# Patient Record
Sex: FEMALE | Race: WHITE | NOT HISPANIC OR LATINO | URBAN - METROPOLITAN AREA
[De-identification: names, ages, dates, MRNs, and addresses within clinical notes are randomized per-mention and may not be internally consistent; named-entity substitution may affect disease eponyms.]

---

## 2022-02-24 ENCOUNTER — NEW PATIENT (OUTPATIENT)
Dept: URBAN - METROPOLITAN AREA CLINIC 79 | Facility: CLINIC | Age: 57
End: 2022-02-24

## 2022-02-24 DIAGNOSIS — H25.13: ICD-10-CM

## 2022-02-24 DIAGNOSIS — H52.03: ICD-10-CM

## 2022-02-24 PROCEDURE — 99204 OFFICE O/P NEW MOD 45 MIN: CPT

## 2022-02-24 PROCEDURE — 92015 DETERMINE REFRACTIVE STATE: CPT

## 2022-02-24 ASSESSMENT — VISUAL ACUITY
OS_CC: 20/20-1
OS_SC: 20/100
OD_SC: 20/100
OD_CC: 20/25-2
OD_CC: 20/30-1
OS_CC: 20/30-1

## 2022-02-24 ASSESSMENT — TONOMETRY
OD_IOP_MMHG: 11
OS_IOP_MMHG: 11

## 2024-01-30 ENCOUNTER — OFFICE VISIT (OUTPATIENT)
Dept: OBGYN CLINIC | Facility: CLINIC | Age: 59
End: 2024-01-30
Payer: COMMERCIAL

## 2024-01-30 VITALS
HEIGHT: 66 IN | DIASTOLIC BLOOD PRESSURE: 66 MMHG | BODY MASS INDEX: 28.93 KG/M2 | SYSTOLIC BLOOD PRESSURE: 110 MMHG | WEIGHT: 180 LBS

## 2024-01-30 DIAGNOSIS — Z12.31 ENCOUNTER FOR SCREENING MAMMOGRAM FOR BREAST CANCER: ICD-10-CM

## 2024-01-30 DIAGNOSIS — Z01.419 ROUTINE GYNECOLOGICAL EXAMINATION: Primary | ICD-10-CM

## 2024-01-30 DIAGNOSIS — Z12.4 SCREENING FOR MALIGNANT NEOPLASM OF THE CERVIX: ICD-10-CM

## 2024-01-30 PROCEDURE — S0610 ANNUAL GYNECOLOGICAL EXAMINA: HCPCS | Performed by: OBSTETRICS & GYNECOLOGY

## 2024-01-30 RX ORDER — TEMAZEPAM 30 MG/1
30 CAPSULE ORAL
COMMUNITY

## 2024-01-30 RX ORDER — ATORVASTATIN CALCIUM 40 MG/1
40 TABLET, FILM COATED ORAL DAILY
COMMUNITY

## 2024-01-30 RX ORDER — CEFUROXIME AXETIL 250 MG/1
TABLET ORAL
COMMUNITY
Start: 2024-01-03

## 2024-01-30 RX ORDER — LAMOTRIGINE 100 MG/1
500 TABLET ORAL DAILY
COMMUNITY

## 2024-01-30 RX ORDER — OXYBUTYNIN CHLORIDE 10 MG/1
10 TABLET, EXTENDED RELEASE ORAL DAILY
COMMUNITY

## 2024-01-30 RX ORDER — ATOGEPANT 60 MG/1
1 TABLET ORAL DAILY
COMMUNITY

## 2024-01-30 RX ORDER — EPINEPHRINE 0.3 MG/.3ML
INJECTION SUBCUTANEOUS
COMMUNITY
Start: 2023-08-24

## 2024-01-30 NOTE — PROGRESS NOTES
[facility] - [place] [id number] [place] [address]    ASSESSMENT/PLAN: Megan Anderson is a 58 y.o.  who presents for annual gynecologic exam.    Encounter for routine gynecologic examination  - Routine well woman exam completed today.  - Cervical Cancer Screening: Current ASCCP Guidelines reviewed. Last Pap: Not on file . Next Pap Due: today  - HPV Vaccination status: Not immunized  - Contraceptive counseling discussed.  Current contraception: none  - Breast Cancer Screening: Last Mammogram Not on file, ordered. Scheduled for next week  - Colorectal cancer screening was not ordered.  - The following were reviewed in today's visit: breast self exam and mammography screening ordered    Additional problems addressed during this visit:  1. Routine gynecological examination    2. Encounter for screening mammogram for breast cancer  -     Mammo screening bilateral w 3d & cad; Future    3. Screening for malignant neoplasm of the cervix  -     IGP, Aptima HPV, Rfx 16/18,45        CC:  Annual Gynecologic Examination    HPI: Megan Anderson is a 58 y.o.  who presents for annual gynecologic examination.  HPI    The following portions of the patient's history were reviewed and updated as appropriate: She  has a past medical history of Bipolar 1 disorder (HCC), Depression, and Migraine.  She  has a past surgical history that includes Hysteroscopy w/ endometrial ablation and Dilation and curettage of uterus.  Her family history includes Hyperlipidemia in her father; Lung cancer in her father.  She  reports that she has never smoked. She has never used smokeless tobacco. She reports that she does not currently use alcohol. She reports that she does not use drugs.  Current Outpatient Medications   Medication Sig Dispense Refill    atorvastatin (LIPITOR) 40 mg tablet Take 40 mg by mouth daily      EPINEPHrine (EPIPEN) 0.3 mg/0.3 mL SOAJ INJECT INTRAMUSCULARLY ONCE AS NEEDED FOR ANAPHYLAXIS       "lamoTRIgine (LaMICtal) 100 mg tablet Take 500 mg by mouth daily      oxybutynin (DITROPAN-XL) 10 MG 24 hr tablet Take 10 mg by mouth daily      Qulipta 60 MG TABS Take 1 tablet by mouth daily      SUMAtriptan Succinate 6 MG/0.5ML SOAJ INJECT 1 AT ONSET OF MIGRAINE, MAY REPEAT ONCE IN 1 HOUR; MAX 2 IN 24 HOURS      temazepam (RESTORIL) 30 mg capsule Take 30 mg by mouth daily at bedtime       No current facility-administered medications for this visit.     She is allergic to codeine and statins..    Review of Systems      Objective:  /66 (BP Location: Right arm, Patient Position: Sitting, Cuff Size: Standard)   Ht 5' 6\" (1.676 m)   Wt 81.6 kg (180 lb)   Breastfeeding No   BMI 29.05 kg/m²    Physical Exam      PE:  General Appearance: alert and oriented, in no acute distress.   HEENT: PERRL, thyroid without masses or tenderness  Breast: No masses, tenderness, skin changes, nipple D/C or axillary or supraclavicular adenopathy  Abdomen: Soft, non-tender, non-distended, no masses, no rebound or guarding.  Pelvic:       External genitalia: Normal appearance, no abnormal pigmentation, no lesions or masses. Normal [place]th[place]'s and [place]'s.      Urinary system: Urethral meatus normal, bladder non-tender.      Vaginal: normal mucosa without prolapse or lesions. Normal-appearing physiologic discharge      Cervix: Normal-appearing, well-epithelialized, no gross lesions or masses No cervical motion tenderness.      Adnexa: No adnexal masses or tenderness noted.      Uterus: Normal-sized, regular contour, midline, mobile, no uterine tenderness.  Extremities: Normal range of motion.   Skin: normal, no rash or abnormalities  Neurologic: alert, oriented x3  Psychiatric: Appropriate affect, mood stable, cooperative with exam.  "

## 2024-02-02 LAB
CYTOLOGIST CVX/VAG CYTO: NORMAL
DX ICD CODE: NORMAL
HPV GENOTYPE REFLEX: NORMAL
HPV I/H RISK 4 DNA CVX QL PROBE+SIG AMP: NEGATIVE
OTHER STN SPEC: NORMAL
PATH REPORT.FINAL DX SPEC: NORMAL
SL AMB NOTE:: NORMAL
SL AMB SPECIMEN ADEQUACY: NORMAL
SL AMB TEST METHODOLOGY: NORMAL

## 2024-02-12 ENCOUNTER — HOSPITAL ENCOUNTER (OUTPATIENT)
Dept: HOSPITAL 99 - HWWDC | Age: 59
End: 2024-02-12
Payer: COMMERCIAL

## 2024-02-12 DIAGNOSIS — Z12.31: Primary | ICD-10-CM

## 2024-02-15 ENCOUNTER — ESTABLISHED COMPREHENSIVE EXAM (OUTPATIENT)
Dept: URBAN - METROPOLITAN AREA CLINIC 79 | Facility: CLINIC | Age: 59
End: 2024-02-15

## 2024-02-15 DIAGNOSIS — H52.03: ICD-10-CM

## 2024-02-15 DIAGNOSIS — H25.13: ICD-10-CM

## 2024-02-15 PROCEDURE — 92015 DETERMINE REFRACTIVE STATE: CPT

## 2024-02-15 PROCEDURE — 92014 COMPRE OPH EXAM EST PT 1/>: CPT

## 2024-02-15 ASSESSMENT — VISUAL ACUITY
OD_CC: 20/20
OS_CC: J2
OD_CC: J1
OS_CC: 20/25

## 2024-02-15 ASSESSMENT — TONOMETRY
OS_IOP_MMHG: 12
OD_IOP_MMHG: 12

## 2024-09-17 ENCOUNTER — HOSPITAL ENCOUNTER (EMERGENCY)
Dept: HOSPITAL 99 - EMR | Age: 59
Discharge: HOME | End: 2024-09-17
Payer: COMMERCIAL

## 2024-09-17 VITALS — RESPIRATION RATE: 14 BRPM | DIASTOLIC BLOOD PRESSURE: 64 MMHG | SYSTOLIC BLOOD PRESSURE: 111 MMHG

## 2024-09-17 VITALS — RESPIRATION RATE: 14 BRPM

## 2024-09-17 VITALS — RESPIRATION RATE: 13 BRPM

## 2024-09-17 VITALS — SYSTOLIC BLOOD PRESSURE: 150 MMHG | DIASTOLIC BLOOD PRESSURE: 86 MMHG | RESPIRATION RATE: 17 BRPM

## 2024-09-17 VITALS — RESPIRATION RATE: 18 BRPM

## 2024-09-17 VITALS — RESPIRATION RATE: 20 BRPM

## 2024-09-17 VITALS — DIASTOLIC BLOOD PRESSURE: 61 MMHG | SYSTOLIC BLOOD PRESSURE: 110 MMHG | RESPIRATION RATE: 14 BRPM

## 2024-09-17 VITALS — BODY MASS INDEX: 26.7 KG/M2

## 2024-09-17 VITALS — RESPIRATION RATE: 17 BRPM

## 2024-09-17 VITALS — RESPIRATION RATE: 14 BRPM | SYSTOLIC BLOOD PRESSURE: 106 MMHG | DIASTOLIC BLOOD PRESSURE: 61 MMHG

## 2024-09-17 VITALS — RESPIRATION RATE: 12 BRPM

## 2024-09-17 VITALS — RESPIRATION RATE: 26 BRPM

## 2024-09-17 VITALS — RESPIRATION RATE: 19 BRPM

## 2024-09-17 VITALS — RESPIRATION RATE: 21 BRPM

## 2024-09-17 DIAGNOSIS — L29.9: ICD-10-CM

## 2024-09-17 DIAGNOSIS — Z88.5: ICD-10-CM

## 2024-09-17 DIAGNOSIS — R06.02: ICD-10-CM

## 2024-09-17 DIAGNOSIS — T78.2XXA: ICD-10-CM

## 2024-09-17 DIAGNOSIS — Z91.030: ICD-10-CM

## 2024-09-17 DIAGNOSIS — R07.89: ICD-10-CM

## 2024-09-17 DIAGNOSIS — R09.89: ICD-10-CM

## 2024-09-17 DIAGNOSIS — F32.A: ICD-10-CM

## 2024-09-17 DIAGNOSIS — R20.2: ICD-10-CM

## 2024-09-17 DIAGNOSIS — T63.441A: Primary | ICD-10-CM

## 2024-09-17 DIAGNOSIS — F41.9: ICD-10-CM

## 2024-09-17 DIAGNOSIS — F41.0: ICD-10-CM

## 2024-09-17 DIAGNOSIS — I45.10: ICD-10-CM

## 2024-09-17 DIAGNOSIS — R51.9: ICD-10-CM

## 2024-09-17 PROCEDURE — 94640 AIRWAY INHALATION TREATMENT: CPT

## 2024-09-17 PROCEDURE — 96360 HYDRATION IV INFUSION INIT: CPT

## 2024-09-17 PROCEDURE — 96372 THER/PROPH/DIAG INJ SC/IM: CPT

## 2024-09-17 PROCEDURE — 99291 CRITICAL CARE FIRST HOUR: CPT

## 2024-09-17 RX ADMIN — ACETAMINOPHEN 650 MG: 325 TABLET ORAL at 17:32

## 2024-09-17 RX ADMIN — SODIUM CHLORIDE 1000: 900 INJECTION, SOLUTION INTRAVENOUS at 18:05

## 2024-09-17 RX ADMIN — IPRATROPIUM BROMIDE AND ALBUTEROL SULFATE 3 ML: 2.5; .5 SOLUTION RESPIRATORY (INHALATION) at 18:03

## 2024-09-17 RX ADMIN — EPINEPHRINE 0.3 MG: 1 INJECTION, SOLUTION, CONCENTRATE INTRAVENOUS at 18:05

## 2025-03-05 ENCOUNTER — ANNUAL EXAM (OUTPATIENT)
Dept: OBGYN CLINIC | Facility: CLINIC | Age: 60
End: 2025-03-05
Payer: COMMERCIAL

## 2025-03-05 VITALS
BODY MASS INDEX: 29.41 KG/M2 | HEIGHT: 66 IN | WEIGHT: 183 LBS | SYSTOLIC BLOOD PRESSURE: 110 MMHG | DIASTOLIC BLOOD PRESSURE: 68 MMHG

## 2025-03-05 DIAGNOSIS — Z12.31 ENCOUNTER FOR SCREENING MAMMOGRAM FOR MALIGNANT NEOPLASM OF BREAST: ICD-10-CM

## 2025-03-05 DIAGNOSIS — Z01.419 ROUTINE GYNECOLOGICAL EXAMINATION: Primary | ICD-10-CM

## 2025-03-05 PROCEDURE — S0612 ANNUAL GYNECOLOGICAL EXAMINA: HCPCS | Performed by: OBSTETRICS & GYNECOLOGY

## 2025-03-05 RX ORDER — OXYBUTYNIN CHLORIDE 10 MG/1
TABLET, EXTENDED RELEASE ORAL EVERY 24 HOURS
COMMUNITY

## 2025-05-23 ENCOUNTER — HOSPITAL ENCOUNTER (OUTPATIENT)
Dept: HOSPITAL 99 - HWWDC | Age: 60
End: 2025-05-23
Payer: COMMERCIAL

## 2025-05-23 DIAGNOSIS — Z12.31: Primary | ICD-10-CM
